# Patient Record
Sex: FEMALE | Race: ASIAN | NOT HISPANIC OR LATINO | Employment: FULL TIME | URBAN - METROPOLITAN AREA
[De-identification: names, ages, dates, MRNs, and addresses within clinical notes are randomized per-mention and may not be internally consistent; named-entity substitution may affect disease eponyms.]

---

## 2022-09-05 ENCOUNTER — APPOINTMENT (OUTPATIENT)
Dept: CT IMAGING | Facility: HOSPITAL | Age: 52
End: 2022-09-05
Attending: EMERGENCY MEDICINE
Payer: COMMERCIAL

## 2022-09-05 ENCOUNTER — HOSPITAL ENCOUNTER (EMERGENCY)
Facility: HOSPITAL | Age: 52
Discharge: HOME | End: 2022-09-05
Attending: EMERGENCY MEDICINE | Admitting: EMERGENCY MEDICINE
Payer: COMMERCIAL

## 2022-09-05 VITALS
OXYGEN SATURATION: 95 % | HEART RATE: 58 BPM | TEMPERATURE: 97.3 F | BODY MASS INDEX: 30.02 KG/M2 | DIASTOLIC BLOOD PRESSURE: 73 MMHG | RESPIRATION RATE: 18 BRPM | HEIGHT: 58 IN | WEIGHT: 143 LBS | SYSTOLIC BLOOD PRESSURE: 121 MMHG

## 2022-09-05 DIAGNOSIS — E86.0 DEHYDRATION: ICD-10-CM

## 2022-09-05 DIAGNOSIS — R11.2 NON-INTRACTABLE VOMITING WITH NAUSEA, UNSPECIFIED VOMITING TYPE: ICD-10-CM

## 2022-09-05 LAB
ALBUMIN SERPL-MCNC: 3.6 G/DL (ref 3.5–5)
ALP SERPL-CCNC: 73 U/L (ref 45–120)
ALT SERPL W P-5'-P-CCNC: 10 U/L (ref 0–45)
ANION GAP SERPL CALCULATED.3IONS-SCNC: 8 MMOL/L (ref 5–18)
AST SERPL W P-5'-P-CCNC: 12 U/L (ref 0–40)
BASOPHILS # BLD AUTO: 0 10E3/UL (ref 0–0.2)
BASOPHILS NFR BLD AUTO: 0 %
BILIRUB SERPL-MCNC: 0.3 MG/DL (ref 0–1)
BUN SERPL-MCNC: 13 MG/DL (ref 8–22)
CALCIUM SERPL-MCNC: 8.5 MG/DL (ref 8.5–10.5)
CHLORIDE BLD-SCNC: 106 MMOL/L (ref 98–107)
CO2 SERPL-SCNC: 25 MMOL/L (ref 22–31)
CREAT SERPL-MCNC: 0.72 MG/DL (ref 0.6–1.1)
EOSINOPHIL # BLD AUTO: 0.1 10E3/UL (ref 0–0.7)
EOSINOPHIL NFR BLD AUTO: 2 %
ERYTHROCYTE [DISTWIDTH] IN BLOOD BY AUTOMATED COUNT: 15.2 % (ref 10–15)
GFR SERPL CREATININE-BSD FRML MDRD: >90 ML/MIN/1.73M2
GLUCOSE BLD-MCNC: 113 MG/DL (ref 70–125)
HCG SERPL QL: NEGATIVE
HCT VFR BLD AUTO: 37.5 % (ref 35–47)
HGB BLD-MCNC: 11.9 G/DL (ref 11.7–15.7)
HOLD SPECIMEN: NORMAL
IMM GRANULOCYTES # BLD: 0 10E3/UL
IMM GRANULOCYTES NFR BLD: 0 %
LIPASE SERPL-CCNC: 14 U/L (ref 0–52)
LYMPHOCYTES # BLD AUTO: 2.1 10E3/UL (ref 0.8–5.3)
LYMPHOCYTES NFR BLD AUTO: 25 %
MCH RBC QN AUTO: 26.2 PG (ref 26.5–33)
MCHC RBC AUTO-ENTMCNC: 31.7 G/DL (ref 31.5–36.5)
MCV RBC AUTO: 83 FL (ref 78–100)
MONOCYTES # BLD AUTO: 0.5 10E3/UL (ref 0–1.3)
MONOCYTES NFR BLD AUTO: 5 %
NEUTROPHILS # BLD AUTO: 5.8 10E3/UL (ref 1.6–8.3)
NEUTROPHILS NFR BLD AUTO: 68 %
NRBC # BLD AUTO: 0 10E3/UL
NRBC BLD AUTO-RTO: 0 /100
PLATELET # BLD AUTO: 329 10E3/UL (ref 150–450)
POTASSIUM BLD-SCNC: 3.7 MMOL/L (ref 3.5–5)
PROT SERPL-MCNC: 7.8 G/DL (ref 6–8)
RBC # BLD AUTO: 4.54 10E6/UL (ref 3.8–5.2)
SARS-COV-2 RNA RESP QL NAA+PROBE: NEGATIVE
SODIUM SERPL-SCNC: 139 MMOL/L (ref 136–145)
WBC # BLD AUTO: 8.5 10E3/UL (ref 4–11)

## 2022-09-05 PROCEDURE — 80053 COMPREHEN METABOLIC PANEL: CPT | Performed by: EMERGENCY MEDICINE

## 2022-09-05 PROCEDURE — 85014 HEMATOCRIT: CPT | Performed by: EMERGENCY MEDICINE

## 2022-09-05 PROCEDURE — 74176 CT ABD & PELVIS W/O CONTRAST: CPT

## 2022-09-05 PROCEDURE — 36415 COLL VENOUS BLD VENIPUNCTURE: CPT | Performed by: EMERGENCY MEDICINE

## 2022-09-05 PROCEDURE — 258N000003 HC RX IP 258 OP 636: Performed by: EMERGENCY MEDICINE

## 2022-09-05 PROCEDURE — 96361 HYDRATE IV INFUSION ADD-ON: CPT

## 2022-09-05 PROCEDURE — 96375 TX/PRO/DX INJ NEW DRUG ADDON: CPT

## 2022-09-05 PROCEDURE — 83690 ASSAY OF LIPASE: CPT | Performed by: EMERGENCY MEDICINE

## 2022-09-05 PROCEDURE — 99285 EMERGENCY DEPT VISIT HI MDM: CPT | Mod: CS,25

## 2022-09-05 PROCEDURE — U0005 INFEC AGEN DETEC AMPLI PROBE: HCPCS | Performed by: EMERGENCY MEDICINE

## 2022-09-05 PROCEDURE — 250N000011 HC RX IP 250 OP 636: Performed by: EMERGENCY MEDICINE

## 2022-09-05 PROCEDURE — C9803 HOPD COVID-19 SPEC COLLECT: HCPCS

## 2022-09-05 PROCEDURE — 96374 THER/PROPH/DIAG INJ IV PUSH: CPT

## 2022-09-05 PROCEDURE — 84703 CHORIONIC GONADOTROPIN ASSAY: CPT | Performed by: EMERGENCY MEDICINE

## 2022-09-05 RX ORDER — ONDANSETRON 2 MG/ML
4 INJECTION INTRAMUSCULAR; INTRAVENOUS ONCE
Status: COMPLETED | OUTPATIENT
Start: 2022-09-05 | End: 2022-09-05

## 2022-09-05 RX ORDER — OXYCODONE AND ACETAMINOPHEN 5; 325 MG/1; MG/1
1 TABLET ORAL EVERY 6 HOURS PRN
Qty: 12 TABLET | Refills: 0 | Status: SHIPPED | OUTPATIENT
Start: 2022-09-05 | End: 2022-09-08

## 2022-09-05 RX ORDER — ONDANSETRON 4 MG/1
4 TABLET, FILM COATED ORAL EVERY 8 HOURS PRN
Qty: 21 TABLET | Refills: 0 | Status: SHIPPED | OUTPATIENT
Start: 2022-09-05

## 2022-09-05 RX ORDER — MORPHINE SULFATE 4 MG/ML
4 INJECTION, SOLUTION INTRAMUSCULAR; INTRAVENOUS EVERY 30 MIN PRN
Status: DISCONTINUED | OUTPATIENT
Start: 2022-09-05 | End: 2022-09-05 | Stop reason: HOSPADM

## 2022-09-05 RX ADMIN — ONDANSETRON 4 MG: 2 INJECTION INTRAMUSCULAR; INTRAVENOUS at 10:14

## 2022-09-05 RX ADMIN — MORPHINE SULFATE 4 MG: 4 INJECTION, SOLUTION INTRAMUSCULAR; INTRAVENOUS at 10:17

## 2022-09-05 RX ADMIN — SODIUM CHLORIDE 1000 ML: 9 INJECTION, SOLUTION INTRAVENOUS at 10:12

## 2022-09-05 ASSESSMENT — ENCOUNTER SYMPTOMS
VOMITING: 1
ABDOMINAL PAIN: 1
NAUSEA: 1

## 2022-09-05 ASSESSMENT — ACTIVITIES OF DAILY LIVING (ADL): ADLS_ACUITY_SCORE: 35

## 2022-09-05 NOTE — DISCHARGE INSTRUCTIONS
Encourage fluids.  Small sips at a time.  Clear liquids only for the next day or 2.  Thereafter, advance diet as tolerated.  1 Zofran every 6-8 hours for nausea or vomiting.  1 Percocet every 6 hours if needed for pain.  Do not drive with this.  Have someone drive you home today.  Over-the-counter Tylenol every 6 hours instead if you do not want something quite so strong.  See your clinic this week for follow-up.  See them sooner or return if worse or problems.

## 2022-09-05 NOTE — ED TRIAGE NOTES
-Patient arrives from home with complaints of dizziness/nausea. Patient was using her phone when she began to experience abdominal pain/nausea which made the patient feel dizzy. On route to ED patient began vomiting. Complaints of numbness in fingers of both hands. Patient alert.      Triage Assessment     Row Name 09/05/22 0942       Triage Assessment (Adult)    Airway WDL WDL       Respiratory WDL    Respiratory WDL WDL       Jose Antonio Coma Scale    Best Eye Response 4-->(E4) spontaneous    Best Motor Response 6-->(M6) obeys commands    Best Verbal Response 5-->(V5) oriented    Jose Antonio Coma Scale Score 15

## 2022-09-05 NOTE — ED PROVIDER NOTES
EMERGENCY DEPARTMENT ENCOUNTER      NAME: Taiwo Levine  AGE: 52 year old female  YOB: 1970  MRN: [id number]  EVALUATION DATE & TIME: 2022  9:46 AM    PCP: No primary care provider on file.    ED PROVIDER: Bony Thao M.D.      Chief Complaint   Patient presents with     Dizziness     Abdominal Pain         FINAL IMPRESSION:  1.  Acute abdominal pain.  2.  Acute nausea and vomiting.      ED COURSE & MEDICAL DECISION MAKIN:51 AM I met with the patient to gather history and to perform my initial exam. We discussed plans for the ED course, including diagnostic testing and treatment. PPE worn: cloth mask.  Patient notes onset this morning of nausea and vomiting.  She also notes diffuse upper abdominal pain.  11:23 AM I reevaluated the patient and updated her on results.  CT unremarkable.  Laboratory work all unremarkable and negative.  Patient feeling somewhat better after fluids and medications.  Discharged home with prescriptions for the same.  They are in agreement with the plan.    Pertinent Labs & Imaging studies reviewed. (See chart for details)    52 year old female presents to the Emergency Department for evaluation of nausea and vomiting with abdominal pain.    At the conclusion of the encounter I discussed the results of all of the tests and the disposition. The questions were answered. The patient or family acknowledged understanding and was agreeable with the care plan.       MEDICATIONS GIVEN IN THE EMERGENCY:  Medications   morphine (PF) injection 4 mg (4 mg Intravenous Given 22 1017)   0.9% sodium chloride BOLUS (1,000 mLs Intravenous New Bag 22 1012)   ondansetron (ZOFRAN) injection 4 mg (4 mg Intravenous Given 22 1014)       NEW PRESCRIPTIONS STARTED AT TODAY'S ER VISIT  New Prescriptions    No medications on file          =================================================================    HPI    Patient information was obtained from: Patient    Use of  ": N/A        Taiwo Levine is a 52 year old female with no recorded pertinent medical history who presents to this ED by walk in with her daughter-in-law for evaluation of vomiting. Patient reports she started to develop nausea, vomiting since this morning with a total of 2 episodes since then. She notes associated abdominal pain. She denies diarrhea. No other reported complaints at this time.    Does not identify any waxing or waning symptoms otherwise, exacerbating or alleviating features,associated symptoms except as mentioned.    REVIEW OF SYSTEMS   Review of Systems   Gastrointestinal: Positive for abdominal pain, nausea and vomiting (x2).   All other systems reviewed and are negative.       PAST MEDICAL HISTORY:  History reviewed. No pertinent past medical history.    PAST SURGICAL HISTORY:  History reviewed. No pertinent surgical history.        CURRENT MEDICATIONS:    nitroglycerin (NITROSTAT) 0.4 MG SL tablet        ALLERGIES:  No Known Allergies    FAMILY HISTORY:  History reviewed. No pertinent family history.    SOCIAL HISTORY:   Social History     Socioeconomic History     Marital status: Single     Spouse name: None     Number of children: None     Years of education: None     Highest education level: None   No drugs, alcohol, or tobacco.    VITALS:  /66   Pulse 64   Temp 97.3  F (36.3  C) (Temporal)   Resp 18   Ht 1.473 m (4' 10\")   Wt 64.9 kg (143 lb)   LMP 08/29/2022   SpO2 98%   BMI 29.89 kg/m      PHYSICAL EXAM    Vital Signs:  /66   Pulse 64   Temp 97.3  F (36.3  C) (Temporal)   Resp 18   Ht 1.473 m (4' 10\")   Wt 64.9 kg (143 lb)   LMP 08/29/2022   SpO2 98%   BMI 29.89 kg/m    General:  On entering the room she is in no apparent distress.    Neck:  Neck supple with full range of motion and nontender.    Back:  Back and spine are nontender.  No costovertebral angle tenderness.    HEENT:  Oropharynx clear with moist mucous membranes.  HEENT unremarkable.  "   Pulmonary:  Chest clear to auscultation without rhonchi rales or wheezing.    Cardiovascular:  Cardiac regular rate and rhythm without murmurs rubs or gallops.    Abdomen:  Abdomen soft and mildly tender in the upper third of the abdomen..  There is no rebound or guarding.    Muskuloskeletal:  she moves all 4 without any difficulty and has normal neurovascular exams.  Extremities without clubbing, cyanosis, or edema.  Legs and calves are nontender.    Neuro:  she is alert and oriented ×3 and moves all extremities symmetrically.    Psych:  Normal affect.    Skin:  Unremarkable and warm and dry.       LAB:  All pertinent labs reviewed and interpreted.  Labs Ordered and Resulted from Time of ED Arrival to Time of ED Departure   CBC WITH PLATELETS AND DIFFERENTIAL - Abnormal       Result Value    WBC Count 8.5      RBC Count 4.54      Hemoglobin 11.9      Hematocrit 37.5      MCV 83      MCH 26.2 (*)     MCHC 31.7      RDW 15.2 (*)     Platelet Count 329      % Neutrophils 68      % Lymphocytes 25      % Monocytes 5      % Eosinophils 2      % Basophils 0      % Immature Granulocytes 0      NRBCs per 100 WBC 0      Absolute Neutrophils 5.8      Absolute Lymphocytes 2.1      Absolute Monocytes 0.5      Absolute Eosinophils 0.1      Absolute Basophils 0.0      Absolute Immature Granulocytes 0.0      Absolute NRBCs 0.0     HCG QUALITATIVE PREGNANCY - Normal    hCG Serum Qualitative Negative     COVID-19 VIRUS (CORONAVIRUS) BY PCR - Normal    SARS CoV2 PCR Negative     COMPREHENSIVE METABOLIC PANEL - Normal    Sodium 139      Potassium 3.7      Chloride 106      Carbon Dioxide (CO2) 25      Anion Gap 8      Urea Nitrogen 13      Creatinine 0.72      Calcium 8.5      Glucose 113      Alkaline Phosphatase 73      AST 12      ALT 10      Protein Total 7.8      Albumin 3.6      Bilirubin Total 0.3      GFR Estimate >90     LIPASE - Normal    Lipase 14         RADIOLOGY:  Reviewed all pertinent imaging. Please see official  radiology report.  Abd/pelvis CT no contrast - Stone Protocol   Preliminary Result   IMPRESSION:    1.  No acute process demonstrated.                    EKG:          PROCEDURES:         I, Rigo Flores, am serving as a scribe to document services personally performed by Dr. Thao based on my observation and the provider's statements to me. I, Bony Thao MD attest that Rigo Flores is acting in a scribe capacity, has observed my performance of the services and has documented them in accordance with my direction.    Bony Thao M.D.  Emergency Medicine  [facility] EMERGENCY DEPARTMENT  [address] [place] [id number] [phone]  Dept: [phone]     Bnoy Thao MD  09/05/22 2095

## 2023-04-16 ENCOUNTER — HEALTH MAINTENANCE LETTER (OUTPATIENT)
Age: 53
End: 2023-04-16

## 2024-06-22 ENCOUNTER — HEALTH MAINTENANCE LETTER (OUTPATIENT)
Age: 54
End: 2024-06-22

## 2025-04-19 ENCOUNTER — HEALTH MAINTENANCE LETTER (OUTPATIENT)
Age: 55
End: 2025-04-19

## 2025-07-12 ENCOUNTER — HEALTH MAINTENANCE LETTER (OUTPATIENT)
Age: 55
End: 2025-07-12